# Patient Record
Sex: FEMALE | Race: WHITE | NOT HISPANIC OR LATINO | Employment: UNEMPLOYED | RURAL
[De-identification: names, ages, dates, MRNs, and addresses within clinical notes are randomized per-mention and may not be internally consistent; named-entity substitution may affect disease eponyms.]

---

## 2017-07-25 ENCOUNTER — OFFICE VISIT (OUTPATIENT)
Dept: CARDIOLOGY | Facility: CLINIC | Age: 57
End: 2017-07-25
Payer: OTHER MISCELLANEOUS

## 2017-07-25 ENCOUNTER — TELEPHONE (OUTPATIENT)
Dept: CARDIOLOGY | Facility: MEDICAL CENTER | Age: 57
End: 2017-07-25

## 2017-07-25 VITALS
HEIGHT: 65 IN | BODY MASS INDEX: 27.49 KG/M2 | WEIGHT: 165 LBS | SYSTOLIC BLOOD PRESSURE: 110 MMHG | OXYGEN SATURATION: 98 % | HEART RATE: 76 BPM | DIASTOLIC BLOOD PRESSURE: 70 MMHG

## 2017-07-25 DIAGNOSIS — Z86.73 HISTORY OF CARDIOEMBOLIC STROKE: ICD-10-CM

## 2017-07-25 DIAGNOSIS — I27.20 PULMONARY HYPERTENSION (HCC): ICD-10-CM

## 2017-07-25 DIAGNOSIS — Q21.12 PFO (PATENT FORAMEN OVALE): ICD-10-CM

## 2017-07-25 PROCEDURE — 99204 OFFICE O/P NEW MOD 45 MIN: CPT | Performed by: INTERNAL MEDICINE

## 2017-07-25 ASSESSMENT — ENCOUNTER SYMPTOMS
CLAUDICATION: 0
WEAKNESS: 0
BLURRED VISION: 0
NERVOUS/ANXIOUS: 0
DOUBLE VISION: 0
DEPRESSION: 0
FALLS: 0
BRUISES/BLEEDS EASILY: 0
COUGH: 0
HEMOPTYSIS: 0
PND: 0
MYALGIAS: 0
BLOOD IN STOOL: 0
POLYDIPSIA: 0
WHEEZING: 0
SEIZURES: 0
LOSS OF CONSCIOUSNESS: 0
PALPITATIONS: 0
HEADACHES: 1
FLANK PAIN: 0
ORTHOPNEA: 0

## 2017-07-25 ASSESSMENT — LIFESTYLE VARIABLES: SUBSTANCE_ABUSE: 0

## 2017-07-25 NOTE — PROGRESS NOTES
Subjective:   Emili Lee is a 58 yo very pleasant lady referred by Dr. Tariq for evaluation of a presumed atrial septal defect. She was valuated for a left frontal lobe stroke in 2015 at [facility] and those records are enclosed. She had an abnormal saline contrast echocardiogram compatible with right to left shunting and also was noted to have dilated right heart and pulmonary hypertension. She's had no cardiac symptoms at all. She was on postmenopausal hormonal replacement at that time and that has been discontinued. She was placed on antiplatelet therapy plus lipid reduction therapy and has never had a recurrent event.  She was evaluated in Dr. Betts.    Past Medical History   Diagnosis Date   • Pregnancy         • History of cardioembolic stroke 7/15/2015     With PFO by saline contrast echo, L frontal lobe     History reviewed. No pertinent past surgical history.  Family History   Problem Relation Age of Onset   • Heart Disease Mother      Unknown congenital heart disease   • Heart Disease Father      arrhythmia   • Other Son      Down's syndrome     History   Smoking status   • Never Smoker    Smokeless tobacco   • Never Used     No Known Allergies  Outpatient Encounter Prescriptions as of 2017   Medication Sig Dispense Refill   • Omega-3 Fatty Acids (OMEGA 3 PO) Take  by mouth.     • multivitamin (THERAGRAN) TABS Take 1 Tab by mouth every day.     • acetaminophen (TYLENOL) 500 MG TABS Take 500-1,000 mg by mouth every 6 hours as needed.     • atorvastatin (LIPITOR) 40 MG TABS Take 1 Tab by mouth every bedtime. 30 Tab 1   • clopidogrel (PLAVIX) 75 MG TABS Take 1 Tab by mouth every day. 30 Tab 1     No facility-administered encounter medications on file as of 2017.     Review of Systems   Constitutional: Negative for malaise/fatigue.   HENT: Negative for nosebleeds.    Eyes: Negative for blurred vision and double vision.   Respiratory: Negative for cough, hemoptysis and  "wheezing.    Cardiovascular: Negative for chest pain, palpitations, orthopnea, claudication, leg swelling and PND.   Gastrointestinal: Negative for blood in stool and melena.   Genitourinary: Negative for hematuria and flank pain.   Musculoskeletal: Negative for myalgias and falls.   Neurological: Positive for headaches (Only with the event). Negative for seizures, loss of consciousness and weakness.   Endo/Heme/Allergies: Negative for polydipsia. Does not bruise/bleed easily.   Psychiatric/Behavioral: Negative for depression and substance abuse. The patient is not nervous/anxious.         Objective:   /70 mmHg  Pulse 76  Ht 1.651 m (5' 5\")  Wt 74.844 kg (165 lb)  BMI 27.46 kg/m2  SpO2 98%    Physical Exam   Constitutional: She is oriented to person, place, and time. She appears well-developed and well-nourished. No distress.   HENT:   Mouth/Throat: Oropharynx is clear and moist.   Eyes: Conjunctivae and EOM are normal.   Neck: Neck supple. No JVD present. No thyroid mass present.   Cardiovascular: Normal rate, regular rhythm, S1 normal and normal pulses.  PMI is not displaced.  Exam reveals no gallop.    Murmur (2/6sem LSB) heard.  Pulses:       Carotid pulses are 2+ on the right side, and 2+ on the left side.       Radial pulses are 2+ on the right side, and 2+ on the left side.        Femoral pulses are 2+ on the right side, and 2+ on the left side.       Dorsalis pedis pulses are 2+ on the right side, and 2+ on the left side.   Widely physiologically split S2   Pulmonary/Chest: Effort normal and breath sounds normal.   Abdominal: Soft. Normal appearance. She exhibits no abdominal bruit and no mass. There is no hepatosplenomegaly. There is no tenderness.   Musculoskeletal: Normal range of motion. She exhibits no edema.   Neurological: She is alert and oriented to person, place, and time. She has normal strength.   Skin: Skin is warm and dry. No rash noted. No cyanosis. Nails show no clubbing. "   Psychiatric: She has a normal mood and affect. Thought content normal.       Assessment:     1. PFO (patent foramen ovale)     2. Pulmonary hypertension (CMS-HCC)     3. History of cardioembolic stroke       The obvious concern is that she actually has a true atrial septal defect given her physical findings and the associated echocardiographic findings.  Medical Decision Making:  Today's Assessment / Status / Plan:     I discussed with her and her  transesophageal echocardiography and conscious sedation and we will try to get this set up soon. It is important to know if she does have a significant shunt and to reassess her pulmonary hypertension and right heart dilation. I would recommend continuing the current medical regimen up to that point and then further discussion based on the results. Thank you very much for the opportunity to see her and to participate in her care.  Addendum: The trans-esophageal echocardiogram did not show an atrial septal defect, just a PFO and therefore no explanation for her dilated right heart.  Her EKGs have been normal. Her right heart pressures were normal at the time of the SHERIN as well. Therefore I'm going to recommend that she continue current therapy but that we get a follow-up transthoracic echo again after the 1st of the year and even consider a right heart catheterization with a congenital O2 run and evaluation of pulmonary artery pressures directly if the right heart findings persist. Immediate reevaluation if she has any recurrent neurological events.

## 2017-07-25 NOTE — LETTER
[facility]   [address] [place] [address]  Phone: [phone]  Fax:                Emili Lee  1960    Encounter Date: 2017    Chris Gallagher M.D.          PROGRESS NOTE:  Subjective:   Emili Lee is a 58 yo very pleasant lady referred by Dr. Tariq for evaluation of a presumed atrial septal defect. She was valuated for a left frontal lobe stroke in 2015 at [facility] and those records are enclosed. She had an abnormal saline contrast echocardiogram compatible with right to left shunting and also was noted to have dilated right heart and pulmonary hypertension. She's had no cardiac symptoms at all. She was on postmenopausal hormonal replacement at that time and that has been discontinued. She was placed on antiplatelet therapy plus lipid reduction therapy and has never had a recurrent event.  She was evaluated in Dr. Betts.    Past Medical History   Diagnosis Date   • Pregnancy         • History of cardioembolic stroke 7/15/2015     With PFO by saline contrast echo, L frontal lobe     History reviewed. No pertinent past surgical history.  Family History   Problem Relation Age of Onset   • Heart Disease Mother      Unknown congenital heart disease   • Heart Disease Father      arrhythmia   • Other Son      Down's syndrome     History   Smoking status   • Never Smoker    Smokeless tobacco   • Never Used     No Known Allergies  Outpatient Encounter Prescriptions as of 2017   Medication Sig Dispense Refill   • Omega-3 Fatty Acids (OMEGA 3 PO) Take  by mouth.     • multivitamin (THERAGRAN) TABS Take 1 Tab by mouth every day.     • acetaminophen (TYLENOL) 500 MG TABS Take 500-1,000 mg by mouth every 6 hours as needed.     • atorvastatin (LIPITOR) 40 MG TABS Take 1 Tab by mouth every bedtime. 30 Tab 1   • clopidogrel (PLAVIX) 75 MG TABS Take 1 Tab by mouth every day. 30 Tab 1     No  "facility-administered encounter medications on file as of 7/25/2017.     Review of Systems   Constitutional: Negative for malaise/fatigue.   HENT: Negative for nosebleeds.    Eyes: Negative for blurred vision and double vision.   Respiratory: Negative for cough, hemoptysis and wheezing.    Cardiovascular: Negative for chest pain, palpitations, orthopnea, claudication, leg swelling and PND.   Gastrointestinal: Negative for blood in stool and melena.   Genitourinary: Negative for hematuria and flank pain.   Musculoskeletal: Negative for myalgias and falls.   Neurological: Positive for headaches (Only with the event). Negative for seizures, loss of consciousness and weakness.   Endo/Heme/Allergies: Negative for polydipsia. Does not bruise/bleed easily.   Psychiatric/Behavioral: Negative for depression and substance abuse. The patient is not nervous/anxious.         Objective:   /70 mmHg  Pulse 76  Ht 1.651 m (5' 5\")  Wt 74.844 kg (165 lb)  BMI 27.46 kg/m2  SpO2 98%    Physical Exam   Constitutional: She is oriented to person, place, and time. She appears well-developed and well-nourished. No distress.   HENT:   Mouth/Throat: Oropharynx is clear and moist.   Eyes: Conjunctivae and EOM are normal.   Neck: Neck supple. No JVD present. No thyroid mass present.   Cardiovascular: Normal rate, regular rhythm, S1 normal and normal pulses.  PMI is not displaced.  Exam reveals no gallop.    Murmur (2/6sem LSB) heard.  Pulses:       Carotid pulses are 2+ on the right side, and 2+ on the left side.       Radial pulses are 2+ on the right side, and 2+ on the left side.        Femoral pulses are 2+ on the right side, and 2+ on the left side.       Dorsalis pedis pulses are 2+ on the right side, and 2+ on the left side.   Widely physiologically split S2   Pulmonary/Chest: Effort normal and breath sounds normal.   Abdominal: Soft. Normal appearance. She exhibits no abdominal bruit and no mass. There is no " hepatosplenomegaly. There is no tenderness.   Musculoskeletal: Normal range of motion. She exhibits no edema.   Neurological: She is alert and oriented to person, place, and time. She has normal strength.   Skin: Skin is warm and dry. No rash noted. No cyanosis. Nails show no clubbing.   Psychiatric: She has a normal mood and affect. Thought content normal.       Assessment:     1. PFO (patent foramen ovale)     2. Pulmonary hypertension (CMS-HCC)     3. History of cardioembolic stroke       The obvious concern is that she actually has a true atrial septal defect given her physical findings and the associated echocardiographic findings.  Medical Decision Making:  Today's Assessment / Status / Plan:     I discussed with her and her  transesophageal echocardiography and conscious sedation and we will try to get this set up soon. It is important to know if she does have a significant shunt and to reassess her pulmonary hypertension and right heart dilation. I would recommend continuing the current medical regimen up to that point and then further discussion based on the results. Thank you very much for the opportunity to see her and to participate in her care.  Addendum: The trans-esophageal echocardiogram did not show an atrial septal defect, just a PFO and therefore no explanation for her dilated right heart.  Her EKGs have been normal. Her right heart pressures were normal at the time of the SHERIN as well. Therefore I'm going to recommend that she continue current therapy but that we get a follow-up transthoracic echo again after the 1st of the year and even consider a right heart catheterization with a congenital O2 run and evaluation of pulmonary artery pressures directly if the right heart findings persist. Immediate reevaluation if she has any recurrent neurological events.      No Recipients

## 2017-07-25 NOTE — Clinical Note
[facility]   [address] [place] [address]  Phone: [phone]  Fax:                Emili Lee  1960    Encounter Date: 2017    Chris Gallagher M.D.          PROGRESS NOTE:  Subjective:   Emili Lee is a 58 yo very pleasant lady referred by Dr. Tariq for evaluation of a presumed atrial septal defect. She was valuated for a left frontal lobe stroke in 2015 at [facility] and those records are enclosed. She had an abnormal saline contrast echocardiogram compatible with right to left shunting and also was noted to have dilated right heart and pulmonary hypertension. She's had no cardiac symptoms at all. She was on postmenopausal hormonal replacement at that time and that has been discontinued. She was placed on antiplatelet therapy plus lipid reduction therapy and has never had a recurrent event.  She was evaluated in Dr. Betts.    Past Medical History   Diagnosis Date   • Pregnancy         • History of cardioembolic stroke 7/15/2015     With PFO by saline contrast echo, L frontal lobe     History reviewed. No pertinent past surgical history.  Family History   Problem Relation Age of Onset   • Heart Disease Mother      Unknown congenital heart disease   • Heart Disease Father      arrhythmia   • Other Son      Down's syndrome     History   Smoking status   • Never Smoker    Smokeless tobacco   • Never Used     No Known Allergies  Outpatient Encounter Prescriptions as of 2017   Medication Sig Dispense Refill   • Omega-3 Fatty Acids (OMEGA 3 PO) Take  by mouth.     • multivitamin (THERAGRAN) TABS Take 1 Tab by mouth every day.     • acetaminophen (TYLENOL) 500 MG TABS Take 500-1,000 mg by mouth every 6 hours as needed.     • atorvastatin (LIPITOR) 40 MG TABS Take 1 Tab by mouth every bedtime. 30 Tab 1   • clopidogrel (PLAVIX) 75 MG TABS Take 1 Tab by mouth every day. 30 Tab 1     No  "facility-administered encounter medications on file as of 7/25/2017.     Review of Systems   Constitutional: Negative for malaise/fatigue.   HENT: Negative for nosebleeds.    Eyes: Negative for blurred vision and double vision.   Respiratory: Negative for cough, hemoptysis and wheezing.    Cardiovascular: Negative for chest pain, palpitations, orthopnea, claudication, leg swelling and PND.   Gastrointestinal: Negative for blood in stool and melena.   Genitourinary: Negative for hematuria and flank pain.   Musculoskeletal: Negative for myalgias and falls.   Neurological: Positive for headaches (Only with the event). Negative for seizures, loss of consciousness and weakness.   Endo/Heme/Allergies: Negative for polydipsia. Does not bruise/bleed easily.   Psychiatric/Behavioral: Negative for depression and substance abuse. The patient is not nervous/anxious.         Objective:   /70 mmHg  Pulse 76  Ht 1.651 m (5' 5\")  Wt 74.844 kg (165 lb)  BMI 27.46 kg/m2  SpO2 98%    Physical Exam   Constitutional: She is oriented to person, place, and time. She appears well-developed and well-nourished. No distress.   HENT:   Mouth/Throat: Oropharynx is clear and moist.   Eyes: Conjunctivae and EOM are normal.   Neck: Neck supple. No JVD present. No thyroid mass present.   Cardiovascular: Normal rate, regular rhythm, S1 normal and normal pulses.  PMI is not displaced.  Exam reveals no gallop.    Murmur (2/6sem LSB) heard.  Pulses:       Carotid pulses are 2+ on the right side, and 2+ on the left side.       Radial pulses are 2+ on the right side, and 2+ on the left side.        Femoral pulses are 2+ on the right side, and 2+ on the left side.       Dorsalis pedis pulses are 2+ on the right side, and 2+ on the left side.   Widely physiologically split S2   Pulmonary/Chest: Effort normal and breath sounds normal.   Abdominal: Soft. Normal appearance. She exhibits no abdominal bruit and no mass. There is no " hepatosplenomegaly. There is no tenderness.   Musculoskeletal: Normal range of motion. She exhibits no edema.   Neurological: She is alert and oriented to person, place, and time. She has normal strength.   Skin: Skin is warm and dry. No rash noted. No cyanosis. Nails show no clubbing.   Psychiatric: She has a normal mood and affect. Thought content normal.       Assessment:     1. PFO (patent foramen ovale)     2. Pulmonary hypertension (CMS-HCC)     3. History of cardioembolic stroke       The obvious concern is that she actually has a true atrial septal defect given her physical findings and the associated echocardiographic findings.  Medical Decision Making:  Today's Assessment / Status / Plan:     I discussed with her and her  transesophageal echocardiography and conscious sedation and we will try to get this set up soon. It is important to know if she does have a significant shunt and to reassess her pulmonary hypertension and right heart dilation. I would recommend continuing the current medical regimen up to that point and then further discussion based on the results. Thank you very much for the opportunity to see her and to participate in her care.      No Recipients

## 2017-07-25 NOTE — TELEPHONE ENCOUNTER
----- Message from Darius Gomez Ass't sent at 7/25/2017  9:44 AM PDT -----  Regarding: SHERIN  JW would like this patient to have a SHERIN. Patient will need this around 08/16-08/18. I will drop the order off when I get back to the office

## 2017-07-26 NOTE — TELEPHONE ENCOUNTER
Patient scheduled for SHERIN on 8-17-17 at [facility] with Dr. Socorro Walker at 7:30. FYI to Dr. Walker.

## 2017-08-15 ENCOUNTER — APPOINTMENT (OUTPATIENT)
Dept: ADMISSIONS | Facility: MEDICAL CENTER | Age: 57
End: 2017-08-15
Attending: INTERNAL MEDICINE
Payer: OTHER MISCELLANEOUS

## 2017-08-15 RX ORDER — ST. JOHN'S WORT 300 MG
CAPSULE ORAL 3 TIMES DAILY
COMMUNITY

## 2017-08-15 RX ORDER — IBUPROFEN 200 MG
200 TABLET ORAL EVERY 6 HOURS PRN
COMMUNITY

## 2017-08-15 RX ORDER — MULTIVITAMIN WITH IRON
TABLET ORAL 2 TIMES DAILY
COMMUNITY

## 2017-08-15 RX ORDER — PANTOTHENIC ACID (VIT B5) 500 MG
TABLET ORAL DAILY
COMMUNITY

## 2017-08-15 RX ORDER — FLUTICASONE PROPIONATE 50 MCG
1 SPRAY, SUSPENSION (ML) NASAL DAILY
COMMUNITY

## 2017-08-15 RX ORDER — MULTIVIT WITH MINERALS/LUTEIN
TABLET ORAL 3 TIMES DAILY
COMMUNITY

## 2017-08-15 RX ORDER — PNV NO.95/FERROUS FUM/FOLIC AC 28MG-0.8MG
TABLET ORAL DAILY
COMMUNITY

## 2017-08-15 RX ORDER — FEXOFENADINE HCL 180 MG/1
180 TABLET ORAL DAILY
COMMUNITY

## 2017-08-15 RX ORDER — VITAMIN E 268 MG
400 CAPSULE ORAL DAILY
COMMUNITY

## 2017-08-15 NOTE — OR NURSING
"Preadmit appointment: \" Preparing for your Procedure information\" sheet given to patient with verbal instructions. Patient instructed to continue prescribed medications through the day before surgery, instructed to take the following medications the day of surgery per anesthesia protocol: none;  Pt advised to check with Dr. Gallagher regarding Plavix pre procedure and she was asking if she is able to go home to [place] same day.  Pt's  will be responsible person   "

## 2017-08-17 ENCOUNTER — HOSPITAL ENCOUNTER (OUTPATIENT)
Facility: MEDICAL CENTER | Age: 57
End: 2017-08-17
Attending: INTERNAL MEDICINE | Admitting: INTERNAL MEDICINE
Payer: OTHER MISCELLANEOUS

## 2017-08-17 VITALS
BODY MASS INDEX: 27.32 KG/M2 | SYSTOLIC BLOOD PRESSURE: 116 MMHG | OXYGEN SATURATION: 92 % | HEIGHT: 65 IN | HEART RATE: 68 BPM | DIASTOLIC BLOOD PRESSURE: 75 MMHG | WEIGHT: 164 LBS | TEMPERATURE: 97.5 F | RESPIRATION RATE: 12 BRPM

## 2017-08-17 PROBLEM — Q21.11 OSTIUM SECUNDUM TYPE ATRIAL SEPTAL DEFECT: Status: ACTIVE | Noted: 2017-08-17

## 2017-08-17 LAB — LV EJECT FRACT  99904: 60

## 2017-08-17 PROCEDURE — 93325 DOPPLER ECHO COLOR FLOW MAPG: CPT

## 2017-08-17 PROCEDURE — 700111 HCHG RX REV CODE 636 W/ 250 OVERRIDE (IP)

## 2017-08-17 PROCEDURE — 93312 ECHO TRANSESOPHAGEAL: CPT

## 2017-08-17 ASSESSMENT — PAIN SCALES - GENERAL
PAINLEVEL_OUTOF10: 0

## 2017-08-17 NOTE — IP AVS SNAPSHOT
" Home Care Instructions                                                                                                                Name:Emili Lee  Medical Record Number:[id number]  CSN: [id number]    YOB: 1960   Age: 57 y.o.  Sex: female  HT:1.651 m (5' 5\") WT: 74.39 kg (164 lb)          Admit Date: 8/17/2017     Discharge Date:   Today's Date: 8/17/2017  Attending Doctor:  Chris Gallagher M.D.                  Allergies:  Other environmental                Discharge Instructions         ACTIVITY: Rest and take it easy for the first 24 hours.  A responsible adult is recommended to remain with you during that time.  It is normal to feel sleepy.  We encourage you to not do anything that requires balance, judgment or coordination.    MILD FLU-LIKE SYMPTOMS ARE NORMAL. YOU MAY EXPERIENCE GENERALIZED MUSCLE ACHES, THROAT IRRITATION, HEADACHE AND/OR SOME NAUSEA.    FOR 24 HOURS DO NOT:  Drive, operate machinery or run household appliances.  Drink beer or alcoholic beverages.   Make important decisions or sign legal documents.      DIET: To avoid nausea, slowly advance diet as tolerated, avoiding spicy or greasy foods for the first day.  Add more substantial food to your diet according to your physician's instructions.  Babies can be fed formula or breast milk as soon as they are hungry.  INCREASE FLUIDS AND FIBER TO AVOID CONSTIPATION.      FOLLOW-UP APPOINTMENT:  A follow-up appointment should be arranged with your doctor; call to schedule.    You should CALL YOUR PHYSICIAN if you develop:  Fever greater than 101 degrees F.  Pain not relieved by medication, or persistent nausea or vomiting.  Excessive bleeding (blood soaking through dressing) or unexpected drainage from the wound.  Extreme redness or swelling around the incision site, drainage of pus or foul smelling drainage.  Inability to urinate or empty your bladder within 8 hours.  Problems with breathing or chest pain.    You should call 911 " if you develop problems with breathing or chest pain.  If you are unable to contact your doctor or surgical center, you should go to the nearest emergency room or urgent care center.      Physician's telephone #: [phone]    If any questions arise, call your doctor.  If your doctor is not available, please feel free to call the Surgical Center at [phone].  The Center is open Monday through Friday from 7AM to 7PM.  You can also call the HEALTH HOTLINE open 24 hours/day, 7 days/week and speak to a nurse at [phone], or toll free at [phone].    A registered nurse may call you a few days after your surgery to see how you are doing after your procedure.    MEDICATIONS: Resume taking daily medication.  Take prescribed pain medication with food.  If no medication is prescribed, you may take non-aspirin pain medication if needed.  PAIN MEDICATION CAN BE VERY CONSTIPATING.  Take a stool softener or laxative such as senokot, pericolace, or milk of magnesia if needed.      If your physician has prescribed pain medication that includes Acetaminophen (Tylenol), do not take additional Acetaminophen (Tylenol) while taking the prescribed medication.    Depression / Suicide Risk    As you are discharged from this [facility] facility, it is important to learn how to keep safe from harming yourself.    Recognize the warning signs:  · Abrupt changes in personality, positive or negative- including increase in energy   · Giving away possessions  · Change in eating patterns- significant weight changes-  positive or negative  · Change in sleeping patterns- unable to sleep or sleeping all the time   · Unwillingness or inability to communicate  · Depression  · Unusual sadness, discouragement and loneliness  · Talk of wanting to die  · Neglect of personal appearance   · Rebelliousness- reckless behavior  · Withdrawal from people/activities they love  · Confusion- inability to concentrate     If you or a loved one  observes any of these behaviors or has concerns about self-harm, here's what you can do:  · Talk about it- your feelings and reasons for harming yourself  · Remove any means that you might use to hurt yourself (examples: pills, rope, extension cords, firearm)  · Get professional help from the community (Mental Health, Substance Abuse, psychological counseling)  · Do not be alone:Call your Safe Contact- someone whom you trust who will be there for you.  · Call your local CRISIS HOTLINE 716-4189 or 221-965-2588  · Call your local Children's Mobile Crisis Response Team [place] (124) 382-4005 or www.Funding Profiles  · Call the toll free National Suicide Prevention Hotlines   · National Suicide Prevention Lifeline 181-283-VVXC (7103)  · [place] [facility] Line Network 800-SUICIDE (038-5531)    Transesophageal Echocardiogram    Transesophageal echocardiography (SHERIN) is a picture test of your heart using sound waves. The pictures taken can give very detailed pictures of your heart. This can help your doctor see if there are problems with your heart. SHERIN can check:  · If your heart has blood clots in it.  · How well your heart valves are working.  · If you have an infection on the inside of your heart.  · Some of the major arteries of your heart.  · If your heart valve is working after a repair.  · Your heart before a procedure that uses a shock to your heart to get the rhythm back to normal.  BEFORE THE PROCEDURE  · Do not eat or drink for 6 hours before the procedure or as told by your doctor.  · Make plans to have someone drive you home after the procedure. Do not drive yourself home.  · An IV tube will be put in your arm.  PROCEDURE  · You will be given a medicine to help you relax (sedative). It will be given through the IV tube.  · A numbing medicine will be sprayed or gargled in the back of your throat to help numb it.  · The tip of the probe is placed into the back of your mouth. You will be asked to swallow. This  helps to pass the probe into your esophagus.  · Once the tip of the probe is in the right place, your doctor can take pictures of your heart.  · You may feel pressure at the back of your throat.  AFTER THE PROCEDURE  · You will be taken to a recovery area so the sedative can wear off.  · Your throat may be sore and scratchy. This will go away slowly over time.  · You will go home when you are fully awake and able to swallow liquids.  · You should have someone stay with you for the next 24 hours.  · Do not drive or operate machinery for the next 24 hours.     This information is not intended to replace advice given to you by your health care provider. Make sure you discuss any questions you have with your health care provider.     Document Released: 10/15/2010 Document Revised: 12/23/2014 Document Reviewed: 06/19/2014  [employer] Interactive Patient Education ©2016 Elsevier Inc.         Medication List      ASK your doctor about these medications        Instructions    Morning Afternoon Evening Bedtime    acetaminophen 500 MG Tabs   Commonly known as:  TYLENOL        Take 500-1,000 mg by mouth every 6 hours as needed.   Dose:  500-1000 mg                        ALLEGRA ALLERGY 180 MG tablet   Generic drug:  fexofenadine        Take 180 mg by mouth every day.   Dose:  180 mg                        atorvastatin 40 MG Tabs   Commonly known as:  LIPITOR        Take 1 Tab by mouth every bedtime.   Dose:  40 mg                        BLACK COHOSH HOT FLASH RELIEF PO        Take  by mouth 3 times a day. 1/2 tab tid                        Calcium 250 MG Caps        Take  by mouth 2 Times a Day. One in the AM; two tabs in the PM                        clopidogrel 75 MG Tabs   Commonly known as:  PLAVIX        Take 1 Tab by mouth every day.   Dose:  75 mg                        fluticasone 50 MCG/ACT nasal spray   Commonly known as:  FLONASE        Spray 1 Spray in nose every day.   Dose:  1 Spray                         ibuprofen 200 MG Tabs   Commonly known as:  MOTRIN        Take 200 mg by mouth every 6 hours as needed.   Dose:  200 mg                        Iron 325 (65 Fe) MG Tabs        Take  by mouth every day.                        MAGNESIUM PO        Take 64 mg by mouth 2 Times a Day.   Dose:  64 mg                        MULTIVITAMIN ADULT PO        Take  by mouth every day.                        NON SPECIFIED        2 Times a Day. Cell wise/ OTC supplement                        OMEGA 3 PO        Take  by mouth.                        Pantothenic Acid 500 MG Tabs        Take  by mouth every day.                        PROBIOTIC PO        Take  by mouth every day.                        St Johns Wort 300 MG Caps        Take  by mouth 3 times a day.                        Vitamin B-6 100 MG Tabs        Take  by mouth 2 Times a Day.                        Vitamin C 1000 MG Tabs        Take  by mouth 3 times a day.                        vitamin D 1000 UNIT Tabs   Commonly known as:  cholecalciferol        Take 1,000 Units by mouth every day.   Dose:  1000 Units                        vitamin e 400 UNIT Caps   Commonly known as:  VITAMIN E        Take 400 Units by mouth every day.   Dose:  400 Units                                Medication Information     Above and/or attached are the medications your physician expects you to take upon discharge. Review all of your home medications and newly ordered medications with your doctor and/or pharmacist. Follow medication instructions as directed by your doctor and/or pharmacist. Please keep your medication list with you and share with your physician. Update the information when medications are discontinued, doses are changed, or new medications (including over-the-counter products) are added; and carry medication information at all times in the event of emergency situations.        Resources     Quit Smoking / Tobacco Use:    I understand the use of any tobacco products increases  my chance of suffering from future heart disease or stroke and could cause other illnesses which may shorten my life. Quitting the use of tobacco products is the single most important thing I can do to improve my health. For further information on smoking / tobacco cessation call a Toll Free Quit Line at 1-632.993.1830 (*National Cancer [place]) or 1-522.416.6613 (American Lung Association) or you can access the web based program at www.lungusa.org.    [place] Tobacco Users Help Line:  (665) 460-2382       Toll Free: 1-820.517.3864  Quit Tobacco Program [facility] (004)200-4367    Crisis Hotline:    [place] Crisis Hotline:  9-269-VFHRWIT or 1-591.611.4539    [place] Crisis Hotline:    1-491.719.6154 or 656-301-6493    Discharge Survey:   Thank you for choosing [facility]. We hope we did everything we could to make your hospital stay a pleasant one. You may be receiving a survey and we would appreciate your time and participation in answering the questions. Your input is very valuable to us in our efforts to improve our service to our patients and their families.            Signatures     My signature on this form indicates that:    1. I acknowledge receipt and understanding of these Home Care Instruction.  2. My questions regarding this information have been answered to my satisfaction.  3. I have formulated a plan with my discharge nurse to obtain my prescribed medications for home.    __________________________________      __________________________________                   Patient Signature                                 Guardian/Responsible Adult Signature      __________________________________                 __________       ________                       Nurse Signature                                               Date                 Time

## 2017-08-17 NOTE — DISCHARGE INSTRUCTIONS
ACTIVITY: Rest and take it easy for the first 24 hours.  A responsible adult is recommended to remain with you during that time.  It is normal to feel sleepy.  We encourage you to not do anything that requires balance, judgment or coordination.    MILD FLU-LIKE SYMPTOMS ARE NORMAL. YOU MAY EXPERIENCE GENERALIZED MUSCLE ACHES, THROAT IRRITATION, HEADACHE AND/OR SOME NAUSEA.    FOR 24 HOURS DO NOT:  Drive, operate machinery or run household appliances.  Drink beer or alcoholic beverages.   Make important decisions or sign legal documents.      DIET: To avoid nausea, slowly advance diet as tolerated, avoiding spicy or greasy foods for the first day.  Add more substantial food to your diet according to your physician's instructions.  Babies can be fed formula or breast milk as soon as they are hungry.  INCREASE FLUIDS AND FIBER TO AVOID CONSTIPATION.      FOLLOW-UP APPOINTMENT:  A follow-up appointment should be arranged with your doctor; call to schedule.    You should CALL YOUR PHYSICIAN if you develop:  Fever greater than 101 degrees F.  Pain not relieved by medication, or persistent nausea or vomiting.  Excessive bleeding (blood soaking through dressing) or unexpected drainage from the wound.  Extreme redness or swelling around the incision site, drainage of pus or foul smelling drainage.  Inability to urinate or empty your bladder within 8 hours.  Problems with breathing or chest pain.    You should call 911 if you develop problems with breathing or chest pain.  If you are unable to contact your doctor or surgical center, you should go to the nearest emergency room or urgent care center.      Physician's telephone #: [phone]    If any questions arise, call your doctor.  If your doctor is not available, please feel free to call the Surgical Center at [phone].  The Center is open Monday through Friday from 7AM to 7PM.  You can also call the [employer] HOTLINE open 24 hours/day, 7 days/week and speak to a nurse  at [phone], or toll free at [phone].    A registered nurse may call you a few days after your surgery to see how you are doing after your procedure.    MEDICATIONS: Resume taking daily medication.  Take prescribed pain medication with food.  If no medication is prescribed, you may take non-aspirin pain medication if needed.  PAIN MEDICATION CAN BE VERY CONSTIPATING.  Take a stool softener or laxative such as senokot, pericolace, or milk of magnesia if needed.      If your physician has prescribed pain medication that includes Acetaminophen (Tylenol), do not take additional Acetaminophen (Tylenol) while taking the prescribed medication.    Depression / Suicide Risk    As you are discharged from this [facility] facility, it is important to learn how to keep safe from harming yourself.    Recognize the warning signs:  · Abrupt changes in personality, positive or negative- including increase in energy   · Giving away possessions  · Change in eating patterns- significant weight changes-  positive or negative  · Change in sleeping patterns- unable to sleep or sleeping all the time   · Unwillingness or inability to communicate  · Depression  · Unusual sadness, discouragement and loneliness  · Talk of wanting to die  · Neglect of personal appearance   · Rebelliousness- reckless behavior  · Withdrawal from people/activities they love  · Confusion- inability to concentrate     If you or a loved one observes any of these behaviors or has concerns about self-harm, here's what you can do:  · Talk about it- your feelings and reasons for harming yourself  · Remove any means that you might use to hurt yourself (examples: pills, rope, extension cords, firearm)  · Get professional help from the community (Mental Health, Substance Abuse, psychological counseling)  · Do not be alone:Call your Safe Contact- someone whom you trust who will be there for you.  · Call your local CRISIS HOTLINE 239-0238 or  337.460.5676  · Call your local Children's Mobile Crisis Response Team [place] (662) 500-3393 or www.Breathometer  · Call the toll free National Suicide Prevention Hotlines   · National Suicide Prevention Lifeline 205-670-JSSX (4743)  · [employer] Line Network 800-SUICIDE (983-7044)    Transesophageal Echocardiogram    Transesophageal echocardiography (SHERIN) is a picture test of your heart using sound waves. The pictures taken can give very detailed pictures of your heart. This can help your doctor see if there are problems with your heart. SHERIN can check:  · If your heart has blood clots in it.  · How well your heart valves are working.  · If you have an infection on the inside of your heart.  · Some of the major arteries of your heart.  · If your heart valve is working after a repair.  · Your heart before a procedure that uses a shock to your heart to get the rhythm back to normal.  BEFORE THE PROCEDURE  · Do not eat or drink for 6 hours before the procedure or as told by your doctor.  · Make plans to have someone drive you home after the procedure. Do not drive yourself home.  · An IV tube will be put in your arm.  PROCEDURE  · You will be given a medicine to help you relax (sedative). It will be given through the IV tube.  · A numbing medicine will be sprayed or gargled in the back of your throat to help numb it.  · The tip of the probe is placed into the back of your mouth. You will be asked to swallow. This helps to pass the probe into your esophagus.  · Once the tip of the probe is in the right place, your doctor can take pictures of your heart.  · You may feel pressure at the back of your throat.  AFTER THE PROCEDURE  · You will be taken to a recovery area so the sedative can wear off.  · Your throat may be sore and scratchy. This will go away slowly over time.  · You will go home when you are fully awake and able to swallow liquids.  · You should have someone stay with you for the next 24  hours.  · Do not drive or operate machinery for the next 24 hours.     This information is not intended to replace advice given to you by your health care provider. Make sure you discuss any questions you have with your health care provider.     Document Released: 10/15/2010 Document Revised: 12/23/2014 Document Reviewed: 06/19/2014  Elsevier Interactive Patient Education ©2016 Elsevier Inc.

## 2017-08-17 NOTE — OR NURSING
0813: Patient to PPU s/p SHERIN. Patient is wide awake. Denies pain or nausea at this is time. Report from anesthesia received.   0915: VSS. Patient awake.  0940: DC instructions given. Questions answered Patient wide awake. VSS. Patient met criteria for discharge.

## 2017-08-17 NOTE — IP AVS SNAPSHOT
8/17/2017    Emili Lee  [address] [place] [address]    Dear Emili:    [facility] wants to ensure your discharge home is safe and you or your loved ones have had all of your questions answered regarding your care after you leave the hospital.    Below is a list of resources and contact information should you have any questions regarding your hospital stay, follow-up instructions, or active medical symptoms.    Questions or Concerns Regarding… Contact   Medical Questions Related to Your Discharge  (7 days a week, 8am-5pm) Contact a Nurse Care Coordinator   [phone]   Medical Questions Not Related to Your Discharge  (24 hours a day / 7 days a week)  Contact the Nurse Health Line   [phone]    Medications or Discharge Instructions Refer to your discharge packet   or contact your [facility] Primary Care Provider   [phone]   Follow-up Appointment(s) Schedule your appointment via [employer]   or contact Scheduling [phone]   Billing Review your statement via [employer]  or contact Billing [phone]   Medical Records Review your records via [employer]   or contact Medical Records [phone]     You may receive a telephone call within two days of discharge. This call is to make certain you understand your discharge instructions and have the opportunity to have any questions answered. You can also easily access your medical information, test results and upcoming appointments via the [employer] free online health management tool. You can learn more and sign up at [employer]/Nuubo. For assistance setting up your [employer] account, please call [phone].    Once again, we want to ensure your discharge home is safe and that you have a clear understanding of any next steps in your care. If you have any questions or concerns, please do not hesitate to contact us, we are here for you. Thank you for choosing [facility] for your healthcare needs.    Sincerely,    Your [facility] Healthcare Team

## 2017-09-06 ENCOUNTER — TELEPHONE (OUTPATIENT)
Dept: CARDIOLOGY | Facility: MEDICAL CENTER | Age: 57
End: 2017-09-06

## 2017-09-06 NOTE — TELEPHONE ENCOUNTER
----- Message from Tabby Silva R.N. sent at 9/5/2017  5:00 PM PDT -----  Regarding: FW: Question's post ablation 8/17  Contact: [phone]      ----- Message -----  From: Pascale Oneal  Sent: 9/5/2017   4:36 PM  To: Tabby Silva R.N.  Subject: Question's post ablation 8/17                    BRIAN/Cathryn Winters is calling to follow up next step post ablation on 8/17. She can be reached at [phone].

## 2017-09-06 NOTE — TELEPHONE ENCOUNTER
"Disregard phone message.    Patient calls because she had a SHERIN done and it turned out \"ok\" and she wants to know if she still needs to take Atorvastatin and Plavix.  I have her on a wait list for Dr. Gallagher on 9/19 in [place] but not sure that this will happen due to booked day that day (and no future [place] schedule).      To Dr. Gallagher to advise.    "

## 2017-09-08 DIAGNOSIS — Q21.12 PFO (PATENT FORAMEN OVALE): ICD-10-CM

## 2017-09-08 NOTE — TELEPHONE ENCOUNTER
She does have a patent foramen ovale. I don't think she has to come into the clinic to review all this. The issue was discussed with Dr. Walker and she probably does not need to proceed with a right heart catheterization at this point because her pulmonary artery pressure was normal on the SHERIN.  In the short-term she does need to continue the atorvastatin and Plavix but longer term will probably be on aspirin alone. I do want to make sure that we see her in follow up periodically here and we will need to get another echocardiogram early next year to see what her pulmonary artery pressure is doing. If it is higher then we are going to need to consider a right heart catheterization.  My issue is that I still don't think we have an adequate explanation for her right heart dilation but she certainly does not seem to have a left to right shunt producing volume overload.  We should do her follow-up and her echocardiogram in [place].

## 2017-09-08 NOTE — TELEPHONE ENCOUNTER
I explained the message thoroughly.  She will make an appointment for echo and wants to establish with Dr. SHARON Walker after JW retires, so she will schedule with her after the echo.

## 2017-12-28 ENCOUNTER — TELEPHONE (OUTPATIENT)
Dept: CARDIOLOGY | Facility: PHYSICIAN GROUP | Age: 57
End: 2017-12-28

## 2017-12-28 NOTE — TELEPHONE ENCOUNTER
Please let her know that Dr. Tariq and I have talking and there are new data suggesting that closure of her atrial septal defect may be appropriate and that does not require heart surgery. Dr. Carty was doing that here but nobody is actively doing it in [place] and it may be better for her to go to [facility]. Our nearest referral center would be [place] where both [facility] and [facility] were but that is too far from [place].  She can discuss that with Dr. Tariq but I would also like her to have a visit with Dr. Reese or Dr. Gillespie and to review this as well.  She would need to come to [place] or [facility] [place] to do that.

## 2017-12-28 NOTE — TELEPHONE ENCOUNTER
I tried to call but one # was disconnected, and the other had some issues.  I mailed her a letter regarding this information.

## 2018-01-16 ENCOUNTER — HOSPITAL ENCOUNTER (OUTPATIENT)
Dept: CARDIOLOGY | Facility: MEDICAL CENTER | Age: 58
End: 2018-01-16
Attending: INTERNAL MEDICINE
Payer: COMMERCIAL

## 2018-01-16 DIAGNOSIS — Q21.12 PFO (PATENT FORAMEN OVALE): ICD-10-CM

## 2018-01-16 LAB
LV EJECT FRACT  99904: 60
LV EJECT FRACT MOD 2C 99903: 63.65
LV EJECT FRACT MOD 4C 99902: 62.49
LV EJECT FRACT MOD BP 99901: 62.81

## 2018-01-16 PROCEDURE — 93306 TTE W/DOPPLER COMPLETE: CPT

## 2018-01-16 PROCEDURE — 93306 TTE W/DOPPLER COMPLETE: CPT | Mod: 26 | Performed by: INTERNAL MEDICINE
